# Patient Record
Sex: FEMALE | Race: BLACK OR AFRICAN AMERICAN | NOT HISPANIC OR LATINO | Employment: OTHER | URBAN - METROPOLITAN AREA
[De-identification: names, ages, dates, MRNs, and addresses within clinical notes are randomized per-mention and may not be internally consistent; named-entity substitution may affect disease eponyms.]

---

## 2018-06-14 ENCOUNTER — OFFICE VISIT (OUTPATIENT)
Dept: URGENT CARE | Facility: CLINIC | Age: 83
End: 2018-06-14
Payer: MEDICARE

## 2018-06-14 VITALS
SYSTOLIC BLOOD PRESSURE: 190 MMHG | OXYGEN SATURATION: 97 % | TEMPERATURE: 97 F | BODY MASS INDEX: 25.11 KG/M2 | RESPIRATION RATE: 19 BRPM | DIASTOLIC BLOOD PRESSURE: 80 MMHG | WEIGHT: 160 LBS | HEIGHT: 67 IN | HEART RATE: 73 BPM

## 2018-06-14 DIAGNOSIS — F41.9 ANXIETY: Primary | ICD-10-CM

## 2018-06-14 DIAGNOSIS — F13.930 WITHDRAWAL FROM BENZODIAZEPINE, UNCOMPLICATED: ICD-10-CM

## 2018-06-14 PROCEDURE — 99203 OFFICE O/P NEW LOW 30 MIN: CPT | Mod: S$GLB,,, | Performed by: NURSE PRACTITIONER

## 2018-06-14 RX ORDER — LORAZEPAM 1 MG/1
1 TABLET ORAL EVERY 6 HOURS PRN
Qty: 15 TABLET | Refills: 0 | Status: SHIPPED | OUTPATIENT
Start: 2018-06-14 | End: 2018-06-17

## 2018-06-15 NOTE — PATIENT INSTRUCTIONS
Benzodiazepine Withdrawal  Benzodiazepine (benzo) medicines are used for anxiety, other mood problems, seizures, alcohol withdrawal, and as muscle relaxants. If you have been taking a benzodiazepine for more than a few weeks, your body gets used to having it. When you stop taking the medicine, withdrawal symptoms develop.  Symptoms  The list of possible withdrawal symptoms is very long. Withdrawal can include pain, agitation, restlessness, shakiness, anxiety, headaches, cramps, dizziness, nausea, vomiting, trouble sleeping, and diarrhea, among many others. More severe reactions can include chest pain, trouble breathing, seizures, delusions, hallucinations, high temperatures, and coma.  As the medicine clears from your system, your body readjusts to not having it. This period is called detox. Detox usually takes a few weeks. But it may take a few months if you have been taking benzodiazepines for several years. Symptoms will go away when detox is complete. If you have been taking benzodiazepines for a long time, you will likely need medical help to prevent severe withdrawals. This is accomplished through a closely monitored regimen where you gradually cut down and wean off of benzodiazepines over weeks to possibly months rather than stop them all at once (cold turkey).  Home care  The healthcare provider may prescribe a sedative to help reduce your symptoms. Take this medicine exactly as instructed. Do not take it more often than prescribed. Never take a sedative with alcohol.  General care  · You will need good nutrition during detox. Eat 3 meals a day. Take vitamin and mineral supplements as directed.  · Do not drink alcohol during your detox.  · If you can, stay with family or friends who can help and support you as you detox.  · Do not drive until all symptoms are gone and you are feeling better.  Follow-up care  Follow up with your healthcare provider, or as advised. Withdrawal can last from a few weeks  to a few months. Once you get past it, you will feel better. If you were misusing benzodiazepines, it is essential that you get support and treatment to stay off them. Your healthcare provider can help. Here are some resources for support:  · Narcotics Anonymous: www.na.org (or check the phone book)  · National [place] on Alcoholism and Drug Dependence: 639.392.8646 or www.ncadd.org  You may benefit from a residential detox program. There, you can stay overnight and get supervised attention and help. Look online or in the phone book for listings under Drug [facility] and Treatment Centers.  When to seek medical advice  Call your healthcare provider right away if any of these occur:  · Severe shakiness  · Increasing upper abdominal pain or vomiting  · Hallucinations  · Headache or confusion  · Severe trouble sleeping  · Depression  · Feeling that you want to harm yourself or others  Call 911  Call emergency services right away if any of these occur:  · Trouble breathing or swallowing, or wheezing  · Severe confusion  · Extreme drowsiness or trouble awakening  · Fainting or loss of consciousness  · Rapid heart rate  · Very low or very high blood pressure  · Seizure  Date Last Reviewed: 3/1/2017  © 6553-0999 [employer]. [address] [place] [address], [place] [address]. All rights reserved. This information is not intended as a substitute for professional medical care. Always follow your healthcare professional's instructions.

## 2018-06-15 NOTE — PROGRESS NOTES
"Subjective:       Patient ID: Jessica Fournier is a 86 y.o. female.    Vitals:  height is 5' 7" (1.702 m) and weight is 72.6 kg (160 lb). Her oral temperature is 97.4 °F (36.3 °C). Her blood pressure is 190/80 (abnormal) and her pulse is 73. Her respiration is 19 and oxygen saturation is 97%.     Chief Complaint: Anxiety    Pt reports anxiety and shaking that started today at 2 pm. Pt says she has been taking ativan about 3-4 times a day for 3-4 years and she has been out of it for 4 days. Pt reports feeling anxious and shaking but denies any hallucinations. Pt denies headache, blurry vision, altered mental status, or slurred speech. Pt says she is out of ativan and is unsure why but says she cannot get any until Sunday.       Anxiety   Presents for initial visit. Onset was in the past 7 days (today at 2:00pm 06/14/2018). The problem has been unchanged. Symptoms include nausea and nervous/anxious behavior. Patient reports no chest pain or shortness of breath. Symptoms occur constantly. The severity of symptoms is mild. Nothing aggravates the symptoms. The quality of sleep is good. Nighttime awakenings: none.         Review of Systems   Constitution: Negative for chills and fever.   HENT: Negative for sore throat.    Eyes: Negative for blurred vision.   Cardiovascular: Negative for chest pain.   Respiratory: Negative for shortness of breath.    Skin: Negative for rash.   Musculoskeletal: Negative for back pain and joint pain.   Gastrointestinal: Positive for nausea. Negative for abdominal pain, diarrhea and vomiting.   Neurological: Negative for headaches.   Psychiatric/Behavioral: The patient is nervous/anxious.        Objective:      Physical Exam   Constitutional: She is oriented to person, place, and time. She appears well-developed and well-nourished. She is cooperative.  Non-toxic appearance. She does not appear ill. No distress.   HENT:   Head: Normocephalic and atraumatic.   Right Ear: Hearing, tympanic " membrane, external ear and ear canal normal.   Left Ear: Hearing, tympanic membrane, external ear and ear canal normal.   Nose: Nose normal. No mucosal edema, rhinorrhea or nasal deformity. No epistaxis. Right sinus exhibits no maxillary sinus tenderness and no frontal sinus tenderness. Left sinus exhibits no maxillary sinus tenderness and no frontal sinus tenderness.   Mouth/Throat: Uvula is midline, oropharynx is clear and moist and mucous membranes are normal. No trismus in the jaw. Normal dentition. No uvula swelling. No posterior oropharyngeal erythema.   Eyes: Conjunctivae and lids are normal. Right eye exhibits no discharge. Left eye exhibits no discharge. No scleral icterus.   Sclera clear bilat   Neck: Trachea normal, normal range of motion, full passive range of motion without pain and phonation normal. Neck supple.   Cardiovascular: Normal rate, regular rhythm, normal heart sounds, intact distal pulses and normal pulses.  Exam reveals no decreased pulses.    Pulmonary/Chest: Effort normal and breath sounds normal. No respiratory distress.   Abdominal: Soft. Normal appearance and bowel sounds are normal. She exhibits no distension, no pulsatile midline mass and no mass. There is no tenderness.   Musculoskeletal: Normal range of motion. She exhibits no edema or deformity.   Neurological: She is alert and oriented to person, place, and time. She exhibits normal muscle tone. She displays no seizure activity. Coordination and gait normal.   Skin: Skin is warm, dry and intact. She is not diaphoretic. No pallor.   Psychiatric: Her speech is normal. Judgment and thought content normal. Her mood appears anxious. She is agitated. Cognition and memory are normal.   Nursing note and vitals reviewed.      Assessment:       1. Anxiety    2. Withdrawal from benzodiazepine, uncomplicated        Plan:         Anxiety  -     LORazepam (ATIVAN) 1 MG tablet; Take 1 tablet (1 mg total) by mouth every 6 (six) hours as needed  for Anxiety.  Dispense: 15 tablet; Refill: 0    Withdrawal from benzodiazepine, uncomplicated  -     LORazepam (ATIVAN) 1 MG tablet; Take 1 tablet (1 mg total) by mouth every 6 (six) hours as needed for Anxiety.  Dispense: 15 tablet; Refill: 0    Pt instructed that benzo withdrawal can be serious and when she needs to go to the er. Pt was given ativan to last her til Sunday.    Discussed pt with dr draper  Patient Instructions       Benzodiazepine Withdrawal  Benzodiazepine (benzo) medicines are used for anxiety, other mood problems, seizures, alcohol withdrawal, and as muscle relaxants. If you have been taking a benzodiazepine for more than a few weeks, your body gets used to having it. When you stop taking the medicine, withdrawal symptoms develop.  Symptoms  The list of possible withdrawal symptoms is very long. Withdrawal can include pain, agitation, restlessness, shakiness, anxiety, headaches, cramps, dizziness, nausea, vomiting, trouble sleeping, and diarrhea, among many others. More severe reactions can include chest pain, trouble breathing, seizures, delusions, hallucinations, high temperatures, and coma.  As the medicine clears from your system, your body readjusts to not having it. This period is called detox. Detox usually takes a few weeks. But it may take a few months if you have been taking benzodiazepines for several years. Symptoms will go away when detox is complete. If you have been taking benzodiazepines for a long time, you will likely need medical help to prevent severe withdrawals. This is accomplished through a closely monitored regimen where you gradually cut down and wean off of benzodiazepines over weeks to possibly months rather than stop them all at once (cold turkey).  Home care  The healthcare provider may prescribe a sedative to help reduce your symptoms. Take this medicine exactly as instructed. Do not take it more often than prescribed. Never take a sedative with  alcohol.  General care  · You will need good nutrition during detox. Eat 3 meals a day. Take vitamin and mineral supplements as directed.  · Do not drink alcohol during your detox.  · If you can, stay with family or friends who can help and support you as you detox.  · Do not drive until all symptoms are gone and you are feeling better.  Follow-up care  Follow up with your healthcare provider, or as advised. Withdrawal can last from a few weeks to a few months. Once you get past it, you will feel better. If you were misusing benzodiazepines, it is essential that you get support and treatment to stay off them. Your healthcare provider can help. Here are some resources for support:  · Narcotics Anonymous: www.na.org (or check the phone book)  · National [place] on Alcoholism and Drug Dependence: 497.546.5981 or www.ncadd.org  You may benefit from a residential detox program. There, you can stay overnight and get supervised attention and help. Look online or in the phone book for listings under Drug Abuse and Treatment Centers.  When to seek medical advice  Call your healthcare provider right away if any of these occur:  · Severe shakiness  · Increasing upper abdominal pain or vomiting  · Hallucinations  · Headache or confusion  · Severe trouble sleeping  · Depression  · Feeling that you want to harm yourself or others  Call 911  Call emergency services right away if any of these occur:  · Trouble breathing or swallowing, or wheezing  · Severe confusion  · Extreme drowsiness or trouble awakening  · Fainting or loss of consciousness  · Rapid heart rate  · Very low or very high blood pressure  · Seizure  Date Last Reviewed: 3/1/2017  © 9740-0314 [employer]. [address] [place] [address], [place] [address]. All rights reserved. This information is not intended as a substitute for professional medical care. Always follow your healthcare professional's instructions.

## 2021-01-26 ENCOUNTER — HOSPITAL ENCOUNTER (EMERGENCY)
Facility: HOSPITAL | Age: 86
Discharge: HOME | End: 2021-01-27
Attending: EMERGENCY MEDICINE
Payer: MEDICARE

## 2021-01-26 DIAGNOSIS — A41.9 SEPSIS: ICD-10-CM

## 2021-01-26 DIAGNOSIS — R50.9 FEVER, UNSPECIFIED FEVER CAUSE: Primary | ICD-10-CM

## 2021-01-26 DIAGNOSIS — T50.Z95A VACCINE REACTION, INITIAL ENCOUNTER: ICD-10-CM

## 2021-01-26 LAB
ALBUMIN SERPL BCP-MCNC: 3.6 G/DL (ref 3.5–5.2)
ALP SERPL-CCNC: 174 U/L (ref 55–135)
ALT SERPL W/O P-5'-P-CCNC: 19 U/L (ref 10–44)
ANION GAP SERPL CALC-SCNC: 9 MMOL/L (ref 8–16)
AST SERPL-CCNC: 30 U/L (ref 10–40)
BASOPHILS # BLD AUTO: 0.03 K/UL (ref 0–0.2)
BASOPHILS NFR BLD: 0.4 % (ref 0–1.9)
BILIRUB SERPL-MCNC: 0.4 MG/DL (ref 0.1–1)
BILIRUB UR QL STRIP: NEGATIVE
BNP SERPL-MCNC: 569 PG/ML (ref 0–99)
BUN SERPL-MCNC: 17 MG/DL (ref 8–23)
CALCIUM SERPL-MCNC: 8.7 MG/DL (ref 8.7–10.5)
CHLORIDE SERPL-SCNC: 103 MMOL/L (ref 95–110)
CLARITY UR: CLEAR
CO2 SERPL-SCNC: 25 MMOL/L (ref 23–29)
COLOR UR: YELLOW
CREAT SERPL-MCNC: 0.8 MG/DL (ref 0.5–1.4)
CTP QC/QA: YES
CTP QC/QA: YES
DIFFERENTIAL METHOD: ABNORMAL
EOSINOPHIL # BLD AUTO: 0 K/UL (ref 0–0.5)
EOSINOPHIL NFR BLD: 0.1 % (ref 0–8)
ERYTHROCYTE [DISTWIDTH] IN BLOOD BY AUTOMATED COUNT: 14.3 % (ref 11.5–14.5)
EST. GFR  (AFRICAN AMERICAN): >60 ML/MIN/1.73 M^2
EST. GFR  (NON AFRICAN AMERICAN): >60 ML/MIN/1.73 M^2
GLUCOSE SERPL-MCNC: 110 MG/DL (ref 70–110)
GLUCOSE UR QL STRIP: NEGATIVE
HCT VFR BLD AUTO: 32.3 % (ref 37–48.5)
HGB BLD-MCNC: 10.1 G/DL (ref 12–16)
HGB UR QL STRIP: ABNORMAL
IMM GRANULOCYTES # BLD AUTO: 0.04 K/UL (ref 0–0.04)
IMM GRANULOCYTES NFR BLD AUTO: 0.5 % (ref 0–0.5)
KETONES UR QL STRIP: NEGATIVE
LACTATE SERPL-SCNC: 1.4 MMOL/L (ref 0.5–2.2)
LEUKOCYTE ESTERASE UR QL STRIP: NEGATIVE
LYMPHOCYTES # BLD AUTO: 0.9 K/UL (ref 1–4.8)
LYMPHOCYTES NFR BLD: 11.9 % (ref 18–48)
MCH RBC QN AUTO: 28.7 PG (ref 27–31)
MCHC RBC AUTO-ENTMCNC: 31.3 G/DL (ref 32–36)
MCV RBC AUTO: 92 FL (ref 82–98)
MONOCYTES # BLD AUTO: 0.6 K/UL (ref 0.3–1)
MONOCYTES NFR BLD: 7.8 % (ref 4–15)
NEUTROPHILS # BLD AUTO: 6 K/UL (ref 1.8–7.7)
NEUTROPHILS NFR BLD: 79.3 % (ref 38–73)
NITRITE UR QL STRIP: NEGATIVE
NRBC BLD-RTO: 0 /100 WBC
PH UR STRIP: 6 [PH] (ref 5–8)
PLATELET # BLD AUTO: 250 K/UL (ref 150–350)
PMV BLD AUTO: 10.4 FL (ref 9.2–12.9)
POC MOLECULAR INFLUENZA A AGN: NEGATIVE
POC MOLECULAR INFLUENZA B AGN: NEGATIVE
POTASSIUM SERPL-SCNC: 3.7 MMOL/L (ref 3.5–5.1)
PROCALCITONIN SERPL IA-MCNC: 0.06 NG/ML
PROT SERPL-MCNC: 7.4 G/DL (ref 6–8.4)
PROT UR QL STRIP: ABNORMAL
RBC # BLD AUTO: 3.52 M/UL (ref 4–5.4)
SARS-COV-2 RDRP RESP QL NAA+PROBE: NEGATIVE
SODIUM SERPL-SCNC: 137 MMOL/L (ref 136–145)
SP GR UR STRIP: 1.02 (ref 1–1.03)
TROPONIN I SERPL DL<=0.01 NG/ML-MCNC: 0.02 NG/ML (ref 0–0.03)
URN SPEC COLLECT METH UR: ABNORMAL
UROBILINOGEN UR STRIP-ACNC: NEGATIVE EU/DL
WBC # BLD AUTO: 7.58 K/UL (ref 3.9–12.7)

## 2021-01-26 PROCEDURE — 93010 ELECTROCARDIOGRAM REPORT: CPT | Mod: ,,, | Performed by: INTERNAL MEDICINE

## 2021-01-26 PROCEDURE — 80053 COMPREHEN METABOLIC PANEL: CPT

## 2021-01-26 PROCEDURE — 96360 HYDRATION IV INFUSION INIT: CPT

## 2021-01-26 PROCEDURE — 84145 PROCALCITONIN (PCT): CPT

## 2021-01-26 PROCEDURE — 81003 URINALYSIS AUTO W/O SCOPE: CPT

## 2021-01-26 PROCEDURE — 63600175 PHARM REV CODE 636 W HCPCS: Performed by: EMERGENCY MEDICINE

## 2021-01-26 PROCEDURE — 85025 COMPLETE CBC W/AUTO DIFF WBC: CPT

## 2021-01-26 PROCEDURE — 96361 HYDRATE IV INFUSION ADD-ON: CPT

## 2021-01-26 PROCEDURE — 84484 ASSAY OF TROPONIN QUANT: CPT

## 2021-01-26 PROCEDURE — 87502 INFLUENZA DNA AMP PROBE: CPT

## 2021-01-26 PROCEDURE — 93005 ELECTROCARDIOGRAM TRACING: CPT

## 2021-01-26 PROCEDURE — 87040 BLOOD CULTURE FOR BACTERIA: CPT

## 2021-01-26 PROCEDURE — 93010 EKG 12-LEAD: ICD-10-PCS | Mod: ,,, | Performed by: INTERNAL MEDICINE

## 2021-01-26 PROCEDURE — 83880 ASSAY OF NATRIURETIC PEPTIDE: CPT

## 2021-01-26 PROCEDURE — U0002 COVID-19 LAB TEST NON-CDC: HCPCS | Performed by: EMERGENCY MEDICINE

## 2021-01-26 PROCEDURE — 25000003 PHARM REV CODE 250: Performed by: EMERGENCY MEDICINE

## 2021-01-26 PROCEDURE — 99285 EMERGENCY DEPT VISIT HI MDM: CPT | Mod: 25

## 2021-01-26 PROCEDURE — 83605 ASSAY OF LACTIC ACID: CPT

## 2021-01-26 RX ORDER — ASPIRIN 81 MG/1
81 TABLET ORAL DAILY
COMMUNITY

## 2021-01-26 RX ORDER — METOPROLOL SUCCINATE 25 MG/1
25 TABLET, EXTENDED RELEASE ORAL DAILY
COMMUNITY

## 2021-01-26 RX ORDER — AMLODIPINE BESYLATE 5 MG/1
5 TABLET ORAL DAILY
COMMUNITY

## 2021-01-26 RX ORDER — ARIPIPRAZOLE 10 MG/1
5 TABLET ORAL DAILY
COMMUNITY

## 2021-01-26 RX ORDER — ATORVASTATIN CALCIUM 80 MG/1
80 TABLET, FILM COATED ORAL DAILY
COMMUNITY

## 2021-01-26 RX ORDER — ACETAMINOPHEN 325 MG/1
325 TABLET ORAL EVERY 6 HOURS PRN
COMMUNITY

## 2021-01-26 RX ORDER — OLANZAPINE 2.5 MG/1
2.5 TABLET ORAL NIGHTLY
COMMUNITY

## 2021-01-26 RX ORDER — ACETAMINOPHEN 500 MG
1000 TABLET ORAL
Status: COMPLETED | OUTPATIENT
Start: 2021-01-26 | End: 2021-01-26

## 2021-01-26 RX ORDER — SERTRALINE HYDROCHLORIDE 50 MG/1
50 TABLET, FILM COATED ORAL DAILY
COMMUNITY

## 2021-01-26 RX ADMIN — ACETAMINOPHEN 1000 MG: 500 TABLET ORAL at 07:01

## 2021-01-26 RX ADMIN — SODIUM CHLORIDE, SODIUM LACTATE, POTASSIUM CHLORIDE, AND CALCIUM CHLORIDE 1836 ML: .6; .31; .03; .02 INJECTION, SOLUTION INTRAVENOUS at 08:01

## 2021-01-27 VITALS
HEIGHT: 66 IN | SYSTOLIC BLOOD PRESSURE: 150 MMHG | DIASTOLIC BLOOD PRESSURE: 78 MMHG | HEART RATE: 62 BPM | RESPIRATION RATE: 16 BRPM | WEIGHT: 135 LBS | OXYGEN SATURATION: 97 % | BODY MASS INDEX: 21.69 KG/M2 | TEMPERATURE: 98 F

## 2021-01-31 LAB
BACTERIA BLD CULT: NORMAL
BACTERIA BLD CULT: NORMAL